# Patient Record
Sex: FEMALE | Race: WHITE | NOT HISPANIC OR LATINO | URBAN - METROPOLITAN AREA
[De-identification: names, ages, dates, MRNs, and addresses within clinical notes are randomized per-mention and may not be internally consistent; named-entity substitution may affect disease eponyms.]

---
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
## 2022-01-24 NOTE — PROCEDURE: TREATMENT REGIMEN
Samples Given: ceracade
Detail Level: Zone
Continue Regimen: For the folliculitis (rash around the hair follicles on the lower legs) \\nRecommend washing in the shower with the Benzaderm wash 1xs daily.\\nPat skin dry\\nApply the Clindamycin lotion in the AM.\\nApply the Lotrisone cream over the top of the red inflamed bumps in the AM & again to the skin at night before bed. only use to the flared areas\\n\\nUse the Aveeno shaving cream (with the light blue cap).\\nUse a Venus five blade razor with moisturizing strip on the blade\\n
Plan: recommended using the cicalfate cream to scars on the legs\\nuse the ceracde daily to the legs to help with dryness and post inflammatory pigment
Continue Regimen: AM:\\n-Cleanse with CeraVe cleanser\\n-Pat dry\\n-Apply CeraVe AM on the face\\n\\nPM:\\n-Cleanse with CeraVe Hydrating wash\\n-Pat dry\\n-Apply a thin layer of the Epiduo Forte rub in well; start using every other night until tolerated. \\n-use the ceracade over top for dryness
Name band;